# Patient Record
Sex: MALE | Race: WHITE | NOT HISPANIC OR LATINO
[De-identification: names, ages, dates, MRNs, and addresses within clinical notes are randomized per-mention and may not be internally consistent; named-entity substitution may affect disease eponyms.]

---

## 2023-05-01 PROBLEM — Z00.00 ENCOUNTER FOR PREVENTIVE HEALTH EXAMINATION: Status: ACTIVE | Noted: 2023-05-01

## 2023-05-05 ENCOUNTER — APPOINTMENT (OUTPATIENT)
Dept: THORACIC SURGERY | Facility: CLINIC | Age: 83
End: 2023-05-05
Payer: MEDICARE

## 2023-05-05 DIAGNOSIS — R91.1 SOLITARY PULMONARY NODULE: ICD-10-CM

## 2023-05-05 PROCEDURE — 99204 OFFICE O/P NEW MOD 45 MIN: CPT

## 2023-11-09 ENCOUNTER — RESULT REVIEW (OUTPATIENT)
Age: 83
End: 2023-11-09

## 2023-11-16 ENCOUNTER — APPOINTMENT (OUTPATIENT)
Dept: THORACIC SURGERY | Facility: CLINIC | Age: 83
End: 2023-11-16

## 2023-11-27 ENCOUNTER — APPOINTMENT (OUTPATIENT)
Dept: THORACIC SURGERY | Facility: CLINIC | Age: 83
End: 2023-11-27
Payer: MEDICARE

## 2023-11-27 PROCEDURE — 99442: CPT | Mod: 95

## 2024-03-11 ENCOUNTER — APPOINTMENT (OUTPATIENT)
Dept: NEUROSURGERY | Facility: CLINIC | Age: 84
End: 2024-03-11
Payer: MEDICARE

## 2024-03-11 DIAGNOSIS — G91.2 (IDIOPATHIC) NORMAL PRESSURE HYDROCEPHALUS: ICD-10-CM

## 2024-03-11 DIAGNOSIS — Z74.09 OTHER REDUCED MOBILITY: ICD-10-CM

## 2024-03-11 PROCEDURE — 99205 OFFICE O/P NEW HI 60 MIN: CPT

## 2024-03-11 NOTE — ASSESSMENT
[FreeTextEntry1] : Mr. 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   Natural history of NPH discussed at length. MRI brain without gadolinium, independently reviewed by me, demonstrates ventriculomegaly.  I recommend MRI brain with and without gadolinium enhancement to include a frameless protocol in order to investigate for an occult obstructive lesion. Of note, ventriculomegaly may be modestly more pronounced when compared directly to remote MRI. I discussed lumbar drain CSF diversion trial to assess potential benefit of ventricular shunt placement. Risks including but not limited to bleeding, infection, nerve root injury, pain, hematoma requiring evacuation, weakness/paralysis, loss of bowel/bladder function, sensory loss, failure to place lumbar drain, need for subsequent procedures discussed. Although the patient may have normal pressure hydrocephalus, as he has difficulty with movement initiation and his gait is magnetic in quality, he also has other comorbidities that can be contributing to gait difficulty and cognitive impairment. I explained that lumbar drain CSF diversion trial would serve as a positive predictor for the efficacy of subsequent ventriculoperitoneal shunt placement. He and his caregiver wish to consider  shunt placement, are well informed about the procedure, and therefore do wish to investigate with lumbar drain trial. We will therefore schedule him for lumbar drain placement with conscious sedation in the near future. During hospitalization, we will plan to drain 15cc of CSF every 2 hours with serial PT, neurologic, and neurosurgical evaluations to assess for clinical improvement.  In the end, I have recommended continued cognitive neurology evaluation with Dr. Khadar Aldrich in addition to a trial of PT for gait training. We have provided him a referral to this effect. They understand that my office remains available to them to continue a discussion regarding CSF diversion and to help facilitate rehabilitation assessments at any time.   The patient and his caregiver understand the plan of care and are in agreement. All questions answered to patient and their satisfaction.  I spent 60 minutes relative to this patient encounter.

## 2024-03-11 NOTE — DATA REVIEWED
[de-identified] : 11/18/23 PET CT: no evidence of metabolic abnormality  [de-identified] : 12/20/23 MRI Brain: no evidence of acute intracranial hemorrhage, mass lesion, or acute infarct. Chronic left caudate lacunes. Stable ventricular dilation, probably representing central greater than cortical atrophy. 1.7 cm right parotid gland lesion, which is suboptimally evaluated but suggests a cyst.

## 2024-03-11 NOTE — HISTORY OF PRESENT ILLNESS
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 Khadar Aldrich with his caregiver in attendance.  History obtained from his caregiver who is in attendance. He has been following with neurology for increasingly unsteady gait with progression since November 2023 and concomitant progressive short term memory difficulties. His gait was noted to be magnetic in quality by Dr. Aldrich. He underwent MRI of his brain in 12/2023 reviewed independently by me which demonstrates ventriculomegaly of the lateral and third ventricles.  He had a recent COVID infection in 1/2024 and was treated with Paxlovid. He is accompanied by his 24 hour caregiver, Keily. His son lives in [place]. He underwent a PET scan of his brain that was reportedly normal. He is noted to have some urinary incontinence for at least six months. Prior to that he was straight catheterizing himself. Mr. Guzmán has history of urethral stricture. He voids every twenty minutes with notable urgency and frequency as per his nurse.   His 24 hour nurse notes short term memory loss for at least 6 months which is profound. He also spent money online that he was unable to recall. His HHA has taken over all of his IADLs.  Psychiatrist: Dr. Rick Underwood  Surg Hx:  radical prostatectomy in 2008, right hip replacement, right shoulder repair Meds:  Aspirin 81 mg, amlodipine 5 mg, albuterol, irbesartan 150 mg, metformin 1000 mg BID, rosuvastatin 20 mg, Seroquel 50 mg, cymbalta, Omeprazole, Vitamin B12, MVI,fiber Allergies: NKDA  Soc Hx: Heavy smoker, Former heavy EtOH, lives with A, worked as President of [employer]  HCP: Sue (daughter ira)

## 2024-03-11 NOTE — PHYSICAL EXAM
[General Appearance - Alert] : alert [General Appearance - In No Acute Distress] : in no acute distress [Place] : oriented to place [Person] : oriented to person [Time] : oriented to time [Fluency] : fluency intact [Comprehension] : comprehension intact [Cranial Nerves Optic (II)] : visual acuity intact bilaterally,  pupils equal round and reactive to light [Cranial Nerves Oculomotor (III)] : extraocular motion intact [Cranial Nerves Facial (VII)] : face symmetrical [Cranial Nerves Trigeminal (V)] : facial sensation intact symmetrically [Cranial Nerves Accessory (XI - Cranial And Spinal)] : head turning and shoulder shrug symmetric [Cranial Nerves Glossopharyngeal (IX)] : tongue and palate midline [Cranial Nerves Vestibulocochlear (VIII)] : hearing was intact bilaterally [Motor Strength] : muscle strength was normal in all four extremities [Cranial Nerves Hypoglossal (XII)] : there was no tongue deviation with protrusion [Sensation Tactile Decrease] : light touch was intact [Short Term Intact] : short term memory impaired [FreeTextEntry5] : delayed recall 1/3  [FreeTextEntry8] : magnetic gait; difficulty with movement initiation

## 2024-04-01 ENCOUNTER — TRANSCRIPTION ENCOUNTER (OUTPATIENT)
Age: 84
End: 2024-04-01

## 2024-04-16 ENCOUNTER — RESULT REVIEW (OUTPATIENT)
Age: 84
End: 2024-04-16

## 2024-04-17 ENCOUNTER — NON-APPOINTMENT (OUTPATIENT)
Age: 84
End: 2024-04-17

## 2024-04-20 ENCOUNTER — RESULT REVIEW (OUTPATIENT)
Age: 84
End: 2024-04-20

## 2024-04-21 ENCOUNTER — TRANSCRIPTION ENCOUNTER (OUTPATIENT)
Age: 84
End: 2024-04-21

## 2024-05-07 ENCOUNTER — APPOINTMENT (OUTPATIENT)
Dept: NEUROSURGERY | Facility: CLINIC | Age: 84
End: 2024-05-07
Payer: MEDICARE

## 2024-05-07 PROCEDURE — 99215 OFFICE O/P EST HI 40 MIN: CPT

## 2024-05-07 NOTE — REASON FOR VISIT
[FreeTextEntry1] : Mr. Guzmán has agreed to 2-way audiovisual consultation.  He is located at his home at [address] [place] [address].  I am located at my office at [facility], [place]. His nursing aide and multiple family members are present with him.

## 2024-05-07 NOTE — HISTORY OF PRESENT ILLNESS
[FreeTextEntry1] : Mr. Guzmán presents for neurosurgical follow up.  Previous note reviewed.  Interval history significant for recent bladder procedure involving Botox injections.  This was performed under general anesthesia.  Post-procedure course notable for protracted emergence from anesthesia and altered mental status.  This was initially transient but has not completely resolved.  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  Presents today for continued discussion regarding CSF diversion trial.

## 2024-05-30 NOTE — ASSESSMENT
[FreeTextEntry1] : Mr. 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  Recently underwent urologic procedure with conscious sedation notable for post-procedure delirium and a lengthy recovery process.  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 Balaji Rojas from neurology next week regarding management, investigative workup, and secondary stroke prevention measures. In addition, I had previously recommended cognitive neurology evaluation with Dr. Aldrich, which I have suggested should be pursued in parallel under the direction of Dr. Rojas or a subspecialist that he recommends.   Given the patient's recent acute ischemic infarct and his protracted recovery from his urologic procedure, I am hesitant to proceed with lumbar drain CSF diversion trial at this time. I explained that the lumbar drain trial is to assess whether ventriculoperitoneal shunt placement would be effective for symptom relief. I explained that the ventriculoperitoneal shunt placement would need to be performed with general anesthesia, and would therefore require an anesthesiologic assessment regarding safety to proceed. I also explained that in the acute phase post-stroke, I would depend on vascular neurologic assessment regarding the safety of discontinuing antiplatelet therapy in the periprocedural time period. I will await Dr. Rojas's assessment regarding this. However the patient's caregivers and family understand the issues at hand and are also reluctant to undergo any procedures that could potentially place him at risk for recurrent stroke.  We will therefore defer CSF diversion trial at this time.  If Mr. Guzmán and his caregivers wish to proceed with CSF diversion in the future, we will certainly accommodate them with the understanding that a thorough preoperative medical, neurologic, and anesthesiology assessment would need to occur to assess safety of proceeding with both lumbar drain placement (which could be done with local anesthesia and without sedation) AND ventriculoperitoneal shunt placement which would require general anesthesia.   I have asked the patient and his aide and family to contact me for any symptomatic development or progression at which time we can obtain expedited follow up imaging.  A total of 60 minutes was spent relative to this encounter Gout

## 2024-07-24 ENCOUNTER — RESULT REVIEW (OUTPATIENT)
Age: 84
End: 2024-07-24